# Patient Record
Sex: FEMALE | Race: WHITE | NOT HISPANIC OR LATINO | Employment: FULL TIME | URBAN - METROPOLITAN AREA
[De-identification: names, ages, dates, MRNs, and addresses within clinical notes are randomized per-mention and may not be internally consistent; named-entity substitution may affect disease eponyms.]

---
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
## 2019-08-09 ENCOUNTER — HOSPITAL ENCOUNTER (EMERGENCY)
Facility: MEDICAL CENTER | Age: 19
End: 2019-08-09
Attending: EMERGENCY MEDICINE
Payer: MEDICAID

## 2019-08-09 VITALS
HEART RATE: 87 BPM | OXYGEN SATURATION: 99 % | SYSTOLIC BLOOD PRESSURE: 118 MMHG | RESPIRATION RATE: 17 BRPM | WEIGHT: 162.92 LBS | HEIGHT: 70 IN | TEMPERATURE: 97.6 F | BODY MASS INDEX: 23.32 KG/M2 | DIASTOLIC BLOOD PRESSURE: 62 MMHG

## 2019-08-09 DIAGNOSIS — N93.9 VAGINAL BLEEDING: ICD-10-CM

## 2019-08-09 DIAGNOSIS — N92.1 MENOMETRORRHAGIA: ICD-10-CM

## 2019-08-09 LAB
APPEARANCE UR: CLEAR
BILIRUB UR QL STRIP.AUTO: NEGATIVE
COLOR UR: YELLOW
GLUCOSE UR STRIP.AUTO-MCNC: NEGATIVE MG/DL
HCG UR QL: NEGATIVE
KETONES UR STRIP.AUTO-MCNC: NEGATIVE MG/DL
LEUKOCYTE ESTERASE UR QL STRIP.AUTO: NEGATIVE
MICRO URNS: NORMAL
NITRITE UR QL STRIP.AUTO: NEGATIVE
PH UR STRIP.AUTO: 7 [PH] (ref 5–8)
PROT UR QL STRIP: NEGATIVE MG/DL
RBC UR QL AUTO: NEGATIVE
SP GR UR STRIP.AUTO: 1
UROBILINOGEN UR STRIP.AUTO-MCNC: 0.2 MG/DL

## 2019-08-09 PROCEDURE — 99284 EMERGENCY DEPT VISIT MOD MDM: CPT

## 2019-08-09 PROCEDURE — 81003 URINALYSIS AUTO W/O SCOPE: CPT

## 2019-08-09 PROCEDURE — 81025 URINE PREGNANCY TEST: CPT

## 2019-08-10 NOTE — ED PROVIDER NOTES
ED Provider Note    Scribed for Navneet Souza M.D. by Martell Vincent. 8/9/2019  6:25 PM    Primary care provider: Deepak Cuadra M.D.  Means of arrival: Walk in   History obtained from: Patient  History limited by: None     CHIEF COMPLAINT  Chief Complaint   Patient presents with   • Vaginal Bleeding       HPI  Luisa Friend is a 19 y.o. female who presents to the Emergency Department for vaginal bleeding onset two weeks ago. The patient states that two weeks ago she missed 3 days of her birth control and got her period for a week. She now states that the last two times she has had sex she bleeds after. The patient notes that as soon as she noticed that she missed her doses she began to take them again. She denies any abdominal pain, fever, or hematuria. She has a family history of uterine cancer.     REVIEW OF SYSTEMS  Pertinent positives include vaginal bleeding. Pertinent negatives include no abdominal pain, fever, and hematuria.      PAST MEDICAL HISTORY   has a past medical history of Anemia, HSV-1 infection, and Thyroid disease.    SURGICAL HISTORY  patient denies any surgical history    SOCIAL HISTORY  Social History     Tobacco Use   • Smoking status: Never Smoker   • Smokeless tobacco: Never Used   Substance Use Topics   • Alcohol use: No   • Drug use: Yes     Types: Marijuana      Social History     Substance and Sexual Activity   Drug Use Yes   • Types: Marijuana       FAMILY HISTORY  Family History   Problem Relation Age of Onset   • Cancer Mother    • Diabetes Mother    • Hypertension Mother    • Stroke Mother    • Hyperlipidemia Mother    • Heart Disease Mother    • Lung Disease Father    • Cancer Maternal Grandmother    • Cancer Paternal Grandmother        CURRENT MEDICATIONS  Home Medications    **Home medications have not yet been reviewed for this encounter**         ALLERGIES  No Known Allergies    PHYSICAL EXAM  VITAL SIGNS: /70   Pulse (!) 103   Temp 36.4 °C (97.6 °F) (Temporal)   Resp 18    "Ht 1.778 m (5' 10\")   Wt 73.9 kg (162 lb 14.7 oz)   SpO2 97%   BMI 23.38 kg/m²   Pulse ox interpretation: Normal  Constitutional: Well developed, Well nourished, No acute distress, Non-toxic appearance.   HENT: Normocephalic, Atraumatic, Bilateral external ears normal, Oropharynx moist, No oral exudates, Nose normal.   Eyes: PERRLA, EOMI, Conjunctiva normal, No discharge.   Cardiovascular: Normal heart rate, Normal rhythm  Thorax & Lungs: Normal breath sounds, No respiratory distress  Abdomen: Bowel sounds normal, Soft, No tenderness, No masses, No pulsatile masses.   Skin: Warm, Dry, No erythema, No rash.   Extremities: Intact distal pulses, No edema, No tenderness, No cyanosis, No clubbing.   Neurologic: Alert & oriented x 3, No focal deficits noted.     LABS  Labs Reviewed   URINALYSIS   HCG QUALITATIVE UR   REFRACTOMETER SG     All labs reviewed by me.    COURSE & MEDICAL DECISION MAKING  Pertinent Labs & Imaging studies reviewed. (See chart for details)    6:25 PM - Patient seen and examined at bedside. Ordered beta-hcg qualitative urine, UA, and refractometer to evaluate her symptoms.       Decision Making:  This is a 19 y.o. year old female who presents with abnormal vaginal bleeding.  Coincided with missed doses of her oral contraceptive medication.  She has resumed and has had some postcoital bleeding.  Denies any active bleeding at this time.  Denies any pain symptoms.    Urinalysis was performed.  The patient does not appear to be pregnant.    Given that she does not have active pain symptoms or active bleeding symptoms at this time, discussed the possibility of performing a pelvic exam.  I do not believe there would be much diagnostic utility in performing a pelvic exam and as a result was deferred.  Recommending continuing her oral contraceptive medications and to follow-up with gynecology for further management.  Suspect that the patient's abnormal vaginal bleeding is related to missed doses of her " oral contraceptive medication.  Recommending follow-up with GYN.  Should her abnormal vaginal bleeding continue, may benefit from endometrial biopsy given family history of uterine cancer.  No indication for further work-up at this time.  The patient does not have symptoms consistent with anemia.    The patient was discharged home in stable condition.  To follow-up with primary care physician for further management.  To return immediately for any worsening of symptoms or development of any other concerning signs or symptoms.      Deepak Cuadra M.D.  [address]  [address]  [facility] [id number]  [phone]    Schedule an appointment as soon as possible for a visit       [facility], Emergency Dept  [address] [facility] [id number] [phone]    As needed, If symptoms worsen    Gynecology      As needed      FINAL IMPRESSION  1. Vaginal bleeding         This dictation has been created using voice recognition software and/or scribes. The accuracy of the dictation is limited by the abilities of the software and the expertise of the scribes. I expect there may be some errors of grammar and possibly content. I made every attempt to manually correct the errors within my dictation. However, errors related to voice recognition software and/or scribes may still exist and should be interpreted within the appropriate context.  E  The note accurately reflects work and decisions made by me.  Navneet Souza  8/10/2019  1:02 AM

## 2019-08-10 NOTE — ED TRIAGE NOTES
"Pt to triage , c/o irregular vaginal bleeding, states \" she messed up 3 days of her birth control , had intercourse and then had heavy bleeding with clots\" . Pt has not taken a home pregnancy test, pt provided urine cup in triage   "

## 2021-09-21 ENCOUNTER — TELEPHONE (OUTPATIENT)
Dept: SCHEDULING | Facility: IMAGING CENTER | Age: 21
End: 2021-09-21

## 2021-10-13 ENCOUNTER — OFFICE VISIT (OUTPATIENT)
Dept: MEDICAL GROUP | Facility: MEDICAL CENTER | Age: 21
End: 2021-10-13
Payer: COMMERCIAL

## 2021-10-13 VITALS
WEIGHT: 188.2 LBS | HEART RATE: 92 BPM | SYSTOLIC BLOOD PRESSURE: 124 MMHG | BODY MASS INDEX: 26.94 KG/M2 | HEIGHT: 70 IN | DIASTOLIC BLOOD PRESSURE: 76 MMHG | RESPIRATION RATE: 16 BRPM | TEMPERATURE: 97.9 F | OXYGEN SATURATION: 99 %

## 2021-10-13 DIAGNOSIS — Z00.00 PREVENTATIVE HEALTH CARE: ICD-10-CM

## 2021-10-13 DIAGNOSIS — Z82.49 FAMILY HISTORY OF BLOOD CLOTS: ICD-10-CM

## 2021-10-13 DIAGNOSIS — B00.9 HSV-1 (HERPES SIMPLEX VIRUS 1) INFECTION: ICD-10-CM

## 2021-10-13 DIAGNOSIS — E03.9 HYPOTHYROIDISM, UNSPECIFIED TYPE: ICD-10-CM

## 2021-10-13 DIAGNOSIS — Z30.09 BIRTH CONTROL COUNSELING: ICD-10-CM

## 2021-10-13 PROBLEM — F41.9 ANXIETY: Status: ACTIVE | Noted: 2018-12-05

## 2021-10-13 PROBLEM — D64.9 ANEMIA: Status: ACTIVE | Noted: 2018-12-05

## 2021-10-13 PROCEDURE — 99214 OFFICE O/P EST MOD 30 MIN: CPT | Performed by: PHYSICIAN ASSISTANT

## 2021-10-13 RX ORDER — NORGESTIMATE AND ETHINYL ESTRADIOL 0.25-0.035
1 KIT ORAL DAILY
COMMUNITY
End: 2021-10-20 | Stop reason: SDUPTHER

## 2021-10-13 ASSESSMENT — PATIENT HEALTH QUESTIONNAIRE - PHQ9: CLINICAL INTERPRETATION OF PHQ2 SCORE: 0

## 2021-10-13 NOTE — PROGRESS NOTES
Subjective:   CC:   Chief Complaint   Patient presents with   • Establish Care     gen labs    • Leg Cramps       Luisa Friend is a 21 y.o. female here today for establishing care.    HPI:  Patient states she started having leg cramps after 1 night of heavy drinking.  Since then she has been getting leg cramps on and off over the Lower Leg Even without Drinking.  Not Related to Physical Activity.  Patient's Mom Has History of Blood Clots However Mom Has History of Uterine Cancer As Melanoma and Diabetes.  Patient Is Concerned about Having Clotting Disorder.    Patient has history of elevated TSH, has been on medicine for only a months.  After but TSH has been within normal limits.    Patient has been on OCP for many years and is thinking about switching to another form of birth control.  Does not want to get pregnant in the near future.  No SE on OCP      Current medicines (including changes today)  Current Outpatient Medications   Medication Sig Dispense Refill   • norgestimate-ethinyl estradiol (ESTARYLLA) 0.25-35 MG-MCG per tablet Take 1 Tablet by mouth every day.     • norgestimate-ethinyl estradiol (MONONESSA) 0.25-35 MG-MCG per tablet Take 1 Tab by mouth every day. (Patient not taking: Reported on 10/13/2021) 28 Tab 6   • acyclovir (ZOVIRAX) 400 MG tablet Take 1 Tab by mouth 3 times a day. (Patient not taking: Reported on 10/13/2021) 21 Tab 3   • albuterol 108 (90 BASE) MCG/ACT Aero Soln inhalation aerosol Inhale 2 Puffs by mouth every 6 hours as needed for Shortness of Breath. (Patient not taking: Reported on 10/13/2021) 8.5 g 3     No current facility-administered medications for this visit.         Past medical, surgical, family, and social history are reviewed in Epic chart by me today.   Medications and allergies reviewed in Epic chart by me today.         ROS   No chest pain, no shortness of breath, no abdominal pain  As documented in history of present illness above     Objective:     /76 (BP  "Location: Left arm, Patient Position: Sitting)   Pulse 92   Temp 36.6 °C (97.9 °F) (Temporal)   Resp 16   Ht 1.778 m (5' 10\")   Wt 85.4 kg (188 lb 3.2 oz)   SpO2 99%  Body mass index is 27 kg/m².   Physical Exam:  Constitutional: Alert, oriented in no acute distress.  Psych: Eye contact is good, speech goal directed, affect calm  Eyes: Conjunctiva non-injected, sclera non-icteric.  ENMT: Ears:Pinna normal. TM pearly gray.               Lips without lesions, Clear oropharynx, mucous membranes pink and moist.  Neck: No cervical or supraclavicular lymphadenopathy,Trachea midline, no thyromegaly, no masses  Lungs: Unlabored respiratory effort, clear to auscultation bilaterally with good excursion, no wheez or rhonci  CV: regular rate and rhythm. No lower extremity edema  Abdomen: soft, nontender, No CVAT  Skin: no lesions in visible areas.  Ext: no edema, color normal, vascularity normal, temperature normal        Assessment and Plan:   The following treatment plan was discussed        1. Preventative health care    - CBC WITH DIFFERENTIAL; Future  - Comp Metabolic Panel; Future  - Lipid Profile; Future  - VITAMIN D,25 HYDROXY; Future  - TSH; Future  - FREE THYROXINE; Future    2. Family history of blood clots  Discussed with patient that most likely mom blood clot was not genetics and was due to cancer and other chronic health problems.     - FACTOR V LEIDEN MUTATION  - PROTEIN C DEFICIENCY PROFILE  - ANTICARDIOLIPIN AB IGG,IGM,IGA; Future  - PROTEIN S FUNCTIONAL; Future  - ANTICARDIOLIPIN AB IGG,IGM,IGA; Future    3. Birth control counseling  Discussed different options such as hormonal and nonhormonal IUD, Nexplanon, progesterone only pills and Depo shot    4. Hypothyroidism, unspecified type  Last TSH within normal limits, we will recheck her TSH and T4    5. HSV-1 (herpes simplex virus 1) infection  Gets occasional cold sore      Followup: Patient is due for Pap  Advised to follow-up for Pap smear, labs  Also " patient has anxiety however would like to find a therapist and have us refer her to.         Please note that this dictation was created using voice recognition software. I have made every reasonable attempt to correct obvious errors, but I expect that there are errors of grammar and possibly content that I did not discover before finalizing the note.

## 2021-10-20 RX ORDER — NORGESTIMATE AND ETHINYL ESTRADIOL 0.25-0.035
1 KIT ORAL DAILY
Qty: 28 TABLET | Refills: 0 | Status: SHIPPED | OUTPATIENT
Start: 2021-10-20 | End: 2021-11-04 | Stop reason: SDUPTHER

## 2021-10-22 ENCOUNTER — HOSPITAL ENCOUNTER (OUTPATIENT)
Dept: LAB | Facility: MEDICAL CENTER | Age: 21
End: 2021-10-22
Attending: PHYSICIAN ASSISTANT
Payer: COMMERCIAL

## 2021-10-22 DIAGNOSIS — Z00.00 PREVENTATIVE HEALTH CARE: ICD-10-CM

## 2021-10-22 DIAGNOSIS — Z82.49 FAMILY HISTORY OF BLOOD CLOTS: ICD-10-CM

## 2021-10-22 LAB
25(OH)D3 SERPL-MCNC: 42 NG/ML (ref 30–100)
ALBUMIN SERPL BCP-MCNC: 4.7 G/DL (ref 3.2–4.9)
ALBUMIN/GLOB SERPL: 1.6 G/DL
ALP SERPL-CCNC: 63 U/L (ref 30–99)
ALT SERPL-CCNC: 20 U/L (ref 2–50)
ANION GAP SERPL CALC-SCNC: 13 MMOL/L (ref 7–16)
AST SERPL-CCNC: 22 U/L (ref 12–45)
BASOPHILS # BLD AUTO: 0.7 % (ref 0–1.8)
BASOPHILS # BLD: 0.04 K/UL (ref 0–0.12)
BILIRUB SERPL-MCNC: 0.7 MG/DL (ref 0.1–1.5)
BUN SERPL-MCNC: 11 MG/DL (ref 8–22)
CALCIUM SERPL-MCNC: 9.8 MG/DL (ref 8.5–10.5)
CHLORIDE SERPL-SCNC: 104 MMOL/L (ref 96–112)
CHOLEST SERPL-MCNC: 175 MG/DL (ref 100–199)
CO2 SERPL-SCNC: 23 MMOL/L (ref 20–33)
CREAT SERPL-MCNC: 0.96 MG/DL (ref 0.5–1.4)
EOSINOPHIL # BLD AUTO: 0.13 K/UL (ref 0–0.51)
EOSINOPHIL NFR BLD: 2.4 % (ref 0–6.9)
ERYTHROCYTE [DISTWIDTH] IN BLOOD BY AUTOMATED COUNT: 45.4 FL (ref 35.9–50)
FASTING STATUS PATIENT QL REPORTED: NORMAL
GLOBULIN SER CALC-MCNC: 3 G/DL (ref 1.9–3.5)
GLUCOSE SERPL-MCNC: 81 MG/DL (ref 65–99)
HCT VFR BLD AUTO: 46.5 % (ref 37–47)
HDLC SERPL-MCNC: 67 MG/DL
HGB BLD-MCNC: 15.3 G/DL (ref 12–16)
IMM GRANULOCYTES # BLD AUTO: 0.01 K/UL (ref 0–0.11)
IMM GRANULOCYTES NFR BLD AUTO: 0.2 % (ref 0–0.9)
LDLC SERPL CALC-MCNC: 95 MG/DL
LYMPHOCYTES # BLD AUTO: 1.7 K/UL (ref 1–4.8)
LYMPHOCYTES NFR BLD: 30.9 % (ref 22–41)
MCH RBC QN AUTO: 31.6 PG (ref 27–33)
MCHC RBC AUTO-ENTMCNC: 32.9 G/DL (ref 33.6–35)
MCV RBC AUTO: 96.1 FL (ref 81.4–97.8)
MONOCYTES # BLD AUTO: 0.5 K/UL (ref 0–0.85)
MONOCYTES NFR BLD AUTO: 9.1 % (ref 0–13.4)
NEUTROPHILS # BLD AUTO: 3.12 K/UL (ref 2–7.15)
NEUTROPHILS NFR BLD: 56.7 % (ref 44–72)
NRBC # BLD AUTO: 0 K/UL
NRBC BLD-RTO: 0 /100 WBC
PLATELET # BLD AUTO: 352 K/UL (ref 164–446)
PMV BLD AUTO: 11 FL (ref 9–12.9)
POTASSIUM SERPL-SCNC: 4.4 MMOL/L (ref 3.6–5.5)
PROT SERPL-MCNC: 7.7 G/DL (ref 6–8.2)
RBC # BLD AUTO: 4.84 M/UL (ref 4.2–5.4)
SODIUM SERPL-SCNC: 140 MMOL/L (ref 135–145)
T4 FREE SERPL-MCNC: 1.2 NG/DL (ref 0.93–1.7)
TRIGL SERPL-MCNC: 66 MG/DL (ref 0–149)
TSH SERPL DL<=0.005 MIU/L-ACNC: 2.89 UIU/ML (ref 0.38–5.33)
WBC # BLD AUTO: 5.5 K/UL (ref 4.8–10.8)

## 2021-10-22 PROCEDURE — 80061 LIPID PANEL: CPT

## 2021-10-22 PROCEDURE — 84439 ASSAY OF FREE THYROXINE: CPT

## 2021-10-22 PROCEDURE — 85025 COMPLETE CBC W/AUTO DIFF WBC: CPT

## 2021-10-22 PROCEDURE — 85303 CLOT INHIBIT PROT C ACTIVITY: CPT

## 2021-10-22 PROCEDURE — 82306 VITAMIN D 25 HYDROXY: CPT

## 2021-10-22 PROCEDURE — 84443 ASSAY THYROID STIM HORMONE: CPT

## 2021-10-22 PROCEDURE — 80053 COMPREHEN METABOLIC PANEL: CPT

## 2021-10-22 PROCEDURE — 85306 CLOT INHIBIT PROT S FREE: CPT

## 2021-10-22 PROCEDURE — 36415 COLL VENOUS BLD VENIPUNCTURE: CPT

## 2021-10-22 PROCEDURE — 81241 F5 GENE: CPT

## 2021-10-22 PROCEDURE — 86147 CARDIOLIPIN ANTIBODY EA IG: CPT

## 2021-10-25 LAB
F5 P.R506Q BLD/T QL: NEGATIVE
PROT C ACT/NOR PPP: 135 % (ref 83–168)
PROT S ACT/NOR PPP: 79 % (ref 57–131)

## 2021-10-26 LAB
CARDIOLIPIN IGA SER IA-ACNC: <10 APL (ref 0–11)
CARDIOLIPIN IGG SER IA-ACNC: <10 GPL (ref 0–14)
CARDIOLIPIN IGM SER IA-ACNC: <10 MPL (ref 0–12)

## 2021-11-04 ENCOUNTER — OFFICE VISIT (OUTPATIENT)
Dept: MEDICAL GROUP | Facility: MEDICAL CENTER | Age: 21
End: 2021-11-04
Payer: COMMERCIAL

## 2021-11-04 VITALS
WEIGHT: 190.6 LBS | OXYGEN SATURATION: 97 % | BODY MASS INDEX: 26.68 KG/M2 | DIASTOLIC BLOOD PRESSURE: 58 MMHG | TEMPERATURE: 98.4 F | RESPIRATION RATE: 20 BRPM | HEART RATE: 67 BPM | HEIGHT: 71 IN | SYSTOLIC BLOOD PRESSURE: 102 MMHG

## 2021-11-04 DIAGNOSIS — Z82.49 FAMILY HISTORY OF BLOOD CLOTS: ICD-10-CM

## 2021-11-04 DIAGNOSIS — Z30.41 SURVEILLANCE FOR BIRTH CONTROL, ORAL CONTRACEPTIVES: ICD-10-CM

## 2021-11-04 PROCEDURE — 99213 OFFICE O/P EST LOW 20 MIN: CPT | Performed by: PHYSICIAN ASSISTANT

## 2021-11-04 RX ORDER — NORGESTIMATE AND ETHINYL ESTRADIOL 0.25-0.035
1 KIT ORAL DAILY
Qty: 84 TABLET | Refills: 3 | Status: SHIPPED | OUTPATIENT
Start: 2021-11-04 | End: 2023-10-17

## 2021-11-04 ASSESSMENT — FIBROSIS 4 INDEX: FIB4 SCORE: 0.32

## 2021-11-04 NOTE — PROGRESS NOTES
"Chief Complaint   Patient presents with   • Follow-Up     labs    • Medication Refill     3-month suppy of birth control        HPI  Luisa Friend is a 21 y.o. female here today for lab f/u:    Last lab results were reviewed by me today and discussed w patient.  Negative for coagulopathy, advised patient could continue her OCP.            Exam:  /58 (BP Location: Left arm, Patient Position: Sitting, BP Cuff Size: Adult long)   Pulse 67   Temp 36.9 °C (98.4 °F) (Temporal)   Resp 20   Ht 1.803 m (5' 11\")   Wt 86.5 kg (190 lb 9.6 oz)   SpO2 97%       Constitutional: Alert, oriented in no acute distress.  Psych: Eye contact is good, speech goal directed, affect calm  Eyes: Conjunctiva non-injected, sclera non-icteric.    Lungs: Unlabored respiratory effort, clear to auscultation bilaterally with good excursion, no wheez or rhonci  CV: regular rate and rhythm. No lower extremity edema      A/P:    1. Surveillance for birth control, oral contraceptives    - norgestimate-ethinyl estradiol (ESTARYLLA) 0.25-35 MG-MCG per tablet; Take 1 Tablet by mouth every day.  Dispense: 84 Tablet; Refill: 3    2. Family history of blood clots    Patient mom has history of blood clots however she has history of uncontrolled diabetes and cancer      F/U: prn   Patient is due for Pap, advised to schedule for Pap smear.  States she has had Pap done twice in the past without abnormal findings.  "

## 2021-12-20 ENCOUNTER — PATIENT MESSAGE (OUTPATIENT)
Dept: MEDICAL GROUP | Facility: MEDICAL CENTER | Age: 21
End: 2021-12-20

## 2021-12-20 RX ORDER — ACYCLOVIR 400 MG/1
400 TABLET ORAL 3 TIMES DAILY
Qty: 21 TABLET | Refills: 3 | Status: SHIPPED | OUTPATIENT
Start: 2021-12-20

## 2023-10-17 ENCOUNTER — OFFICE VISIT (OUTPATIENT)
Dept: URGENT CARE | Facility: CLINIC | Age: 23
End: 2023-10-17
Payer: COMMERCIAL

## 2023-10-17 VITALS
WEIGHT: 210 LBS | DIASTOLIC BLOOD PRESSURE: 60 MMHG | RESPIRATION RATE: 16 BRPM | SYSTOLIC BLOOD PRESSURE: 96 MMHG | OXYGEN SATURATION: 97 % | TEMPERATURE: 97.8 F | HEIGHT: 71 IN | BODY MASS INDEX: 29.4 KG/M2 | HEART RATE: 88 BPM

## 2023-10-17 DIAGNOSIS — J01.90 ACUTE BACTERIAL SINUSITIS: ICD-10-CM

## 2023-10-17 DIAGNOSIS — B96.89 ACUTE BACTERIAL SINUSITIS: ICD-10-CM

## 2023-10-17 PROCEDURE — 3078F DIAST BP <80 MM HG: CPT | Performed by: PHYSICIAN ASSISTANT

## 2023-10-17 PROCEDURE — 99213 OFFICE O/P EST LOW 20 MIN: CPT | Performed by: PHYSICIAN ASSISTANT

## 2023-10-17 PROCEDURE — 3074F SYST BP LT 130 MM HG: CPT | Performed by: PHYSICIAN ASSISTANT

## 2023-10-17 RX ORDER — METHYLPREDNISOLONE 4 MG/1
4 TABLET ORAL DAILY
Qty: 21 TABLET | Refills: 0 | Status: SHIPPED | OUTPATIENT
Start: 2023-10-17

## 2023-10-17 RX ORDER — DOXYCYCLINE HYCLATE 100 MG
100 TABLET ORAL 2 TIMES DAILY
Qty: 14 TABLET | Refills: 0 | Status: SHIPPED | OUTPATIENT
Start: 2023-10-17 | End: 2023-10-24

## 2023-10-17 RX ORDER — AMOXICILLIN 500 MG/1
CAPSULE ORAL
COMMUNITY
Start: 2023-09-15

## 2023-10-17 ASSESSMENT — ENCOUNTER SYMPTOMS
FEVER: 0
EYE DISCHARGE: 0
HEADACHES: 0
CHILLS: 0
COUGH: 0
CONSTIPATION: 0
EYE PAIN: 0
SHORTNESS OF BREATH: 0
NAUSEA: 0
ABDOMINAL PAIN: 0
DIAPHORESIS: 0
SORE THROAT: 0
EYE REDNESS: 0
DIARRHEA: 0
SINUS PAIN: 1
WHEEZING: 0
VOMITING: 0
DIZZINESS: 0

## 2023-10-17 ASSESSMENT — FIBROSIS 4 INDEX: FIB4 SCORE: 0.35

## 2023-11-06 ENCOUNTER — SUPERVISING PHYSICIAN REVIEW (OUTPATIENT)
Dept: URGENT CARE | Facility: CLINIC | Age: 23
End: 2023-11-06
Payer: COMMERCIAL